# Patient Record
Sex: FEMALE | Race: BLACK OR AFRICAN AMERICAN | Employment: FULL TIME
[De-identification: names, ages, dates, MRNs, and addresses within clinical notes are randomized per-mention and may not be internally consistent; named-entity substitution may affect disease eponyms.]

---

## 2024-03-01 ENCOUNTER — APPOINTMENT (OUTPATIENT)
Facility: HOSPITAL | Age: 34
End: 2024-03-01
Payer: COMMERCIAL

## 2024-03-01 ENCOUNTER — HOSPITAL ENCOUNTER (EMERGENCY)
Facility: HOSPITAL | Age: 34
Discharge: HOME | End: 2024-03-01
Attending: EMERGENCY MEDICINE
Payer: COMMERCIAL

## 2024-03-01 VITALS
HEIGHT: 69 IN | RESPIRATION RATE: 16 BRPM | BODY MASS INDEX: 40.16 KG/M2 | OXYGEN SATURATION: 100 % | DIASTOLIC BLOOD PRESSURE: 80 MMHG | TEMPERATURE: 97.5 F | HEART RATE: 76 BPM | WEIGHT: 271.17 LBS | SYSTOLIC BLOOD PRESSURE: 123 MMHG

## 2024-03-01 DIAGNOSIS — M25.562 ACUTE PAIN OF LEFT KNEE: Primary | ICD-10-CM

## 2024-03-01 PROCEDURE — 99283 EMERGENCY DEPT VISIT LOW MDM: CPT

## 2024-03-01 PROCEDURE — 73562 X-RAY EXAM OF KNEE 3: CPT

## 2024-03-01 ASSESSMENT — PAIN DESCRIPTION - PAIN TYPE: TYPE: ACUTE PAIN;CHRONIC PAIN

## 2024-03-01 ASSESSMENT — PAIN - FUNCTIONAL ASSESSMENT: PAIN_FUNCTIONAL_ASSESSMENT: 0-10

## 2024-03-01 ASSESSMENT — PAIN DESCRIPTION - FREQUENCY: FREQUENCY: CONTINUOUS

## 2024-03-01 ASSESSMENT — PAIN DESCRIPTION - ORIENTATION: ORIENTATION: LEFT;POSTERIOR

## 2024-03-01 ASSESSMENT — PAIN DESCRIPTION - LOCATION: LOCATION: LEG;KNEE

## 2024-03-01 ASSESSMENT — PAIN DESCRIPTION - DESCRIPTORS: DESCRIPTORS: SHOOTING;SHARP

## 2024-03-01 NOTE — ED TRIAGE NOTES
Presented to the ED from work after collapsing when left knee have out. Hx: left knee injury and is scheduled to see ortho in 2 weeks. Fell to knee, no other trauma reported, denies LOC.

## 2024-03-01 NOTE — ED PROVIDER NOTES
[facility]  EMERGENCY DEPARTMENT ENCOUNTER      Pt Name: Jessica Joe  MRN: [id number]  Birthdate 1990  Date of evaluation: 3/1/2024  Provider: Arnold Wing MD      HISTORY OF PRESENT ILLNESS      [facility]  33-year-old female presenting due to left knee pain.  Patient says for a while now she has been having problems with her left knee and intermittently it gives out.  This happened today and then she fell onto her left knee now she has pain on the posterior aspect of it.  She denies pain elsewhere.  She is scheduled to see orthopedics in 2 weeks      Nursing Notes were reviewed.    REVIEW OF SYSTEMS         Review of Systems  All systems reviewed are negative unless otherwise document in the HPI      PAST MEDICAL HISTORY     Past Medical History:   Diagnosis Date    Knee gives way          SURGICAL HISTORY     No past surgical history on file.      CURRENT MEDICATIONS       Previous Medications    MULTIPLE VITAMIN (MULTIVITAMINS PO)    Take by mouth       ALLERGIES     Dimetapp multisymptom cold-flu [diphenhydramine-pe-apap] and Robitussin dm max day-night    FAMILY HISTORY     No family history on file.       SOCIAL HISTORY       Social History     Socioeconomic History    Marital status: Single   Tobacco Use    Smoking status: Never    Smokeless tobacco: Never   Vaping Use    Vaping Use: Never used   Substance and Sexual Activity    Alcohol use: Yes    Drug use: Never         PHYSICAL EXAM       ED Triage Vitals [03/01/24 1014]   BP Temp Temp Source Pulse Respirations SpO2 Height Weight - Scale   137/83 97.5 °F (36.4 °C) Tympanic 76 16 98 % 1.753 m (5' 9\") 123 kg (271 lb 2.7 oz)       Body mass index is 40.04 kg/m².    Physical Exam  Constitutional:       Comments: Not acutely distressed or ill-appearing   Cardiovascular:      Rate and Rhythm: Normal rate and regular rhythm.      Heart sounds: No murmur heard.     Comments: 2+ left DP pulse  Pulmonary:      Effort: Pulmonary effort is normal. No

## 2024-03-01 NOTE — DISCHARGE INSTRUCTIONS
Return with any new symptoms you find concerning.  In the meantime keep your upcoming appointment with orthopedics as planned